# Patient Record
Sex: FEMALE | Race: WHITE | NOT HISPANIC OR LATINO
[De-identification: names, ages, dates, MRNs, and addresses within clinical notes are randomized per-mention and may not be internally consistent; named-entity substitution may affect disease eponyms.]

---

## 2017-05-16 ENCOUNTER — RESULT REVIEW (OUTPATIENT)
Age: 47
End: 2017-05-16

## 2017-10-05 ENCOUNTER — APPOINTMENT (OUTPATIENT)
Dept: VASCULAR SURGERY | Facility: CLINIC | Age: 47
End: 2017-10-05
Payer: COMMERCIAL

## 2017-10-05 VITALS
HEART RATE: 78 BPM | SYSTOLIC BLOOD PRESSURE: 105 MMHG | DIASTOLIC BLOOD PRESSURE: 61 MMHG | BODY MASS INDEX: 21.85 KG/M2 | WEIGHT: 128 LBS | HEIGHT: 64 IN | TEMPERATURE: 97.6 F

## 2017-10-05 VITALS — DIASTOLIC BLOOD PRESSURE: 69 MMHG | HEART RATE: 75 BPM | SYSTOLIC BLOOD PRESSURE: 102 MMHG

## 2017-10-05 PROCEDURE — 99203 OFFICE O/P NEW LOW 30 MIN: CPT | Mod: 25

## 2017-10-05 PROCEDURE — 93970 EXTREMITY STUDY: CPT

## 2017-10-05 PROCEDURE — 0: CUSTOM

## 2017-11-20 ENCOUNTER — OUTPATIENT (OUTPATIENT)
Dept: OUTPATIENT SERVICES | Facility: HOSPITAL | Age: 47
LOS: 1 days | End: 2017-11-20

## 2017-11-20 VITALS
WEIGHT: 128.09 LBS | SYSTOLIC BLOOD PRESSURE: 102 MMHG | TEMPERATURE: 98 F | DIASTOLIC BLOOD PRESSURE: 78 MMHG | RESPIRATION RATE: 16 BRPM | HEART RATE: 66 BPM | HEIGHT: 64 IN

## 2017-11-20 DIAGNOSIS — I83.811 VARICOSE VEINS OF RIGHT LOWER EXTREMITY WITH PAIN: ICD-10-CM
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
## 2017-11-20 DIAGNOSIS — I83.893 VARICOSE VEINS OF BILATERAL LOWER EXTREMITIES WITH OTHER COMPLICATIONS: ICD-10-CM

## 2017-11-20 LAB
HCT VFR BLD CALC: 40.8 % — SIGNIFICANT CHANGE UP (ref 34.5–45)
HGB BLD-MCNC: 13.7 G/DL — SIGNIFICANT CHANGE UP (ref 11.5–15.5)
MCHC RBC-ENTMCNC: 30.6 PG — SIGNIFICANT CHANGE UP (ref 27–34)
MCHC RBC-ENTMCNC: 33.6 % — SIGNIFICANT CHANGE UP (ref 32–36)
MCV RBC AUTO: 91.1 FL — SIGNIFICANT CHANGE UP (ref 80–100)
NRBC # FLD: 0 — SIGNIFICANT CHANGE UP
PLATELET # BLD AUTO: 215 K/UL — SIGNIFICANT CHANGE UP (ref 150–400)
PMV BLD: 9.9 FL — SIGNIFICANT CHANGE UP (ref 7–13)
RBC # BLD: 4.48 M/UL — SIGNIFICANT CHANGE UP (ref 3.8–5.2)
RBC # FLD: 12.8 % — SIGNIFICANT CHANGE UP (ref 10.3–14.5)
WBC # BLD: 6.89 K/UL — SIGNIFICANT CHANGE UP (ref 3.8–10.5)
WBC # FLD AUTO: 6.89 K/UL — SIGNIFICANT CHANGE UP (ref 3.8–10.5)

## 2017-11-20 RX ORDER — SODIUM CHLORIDE 9 MG/ML
1000 INJECTION, SOLUTION INTRAVENOUS
Qty: 0 | Refills: 0 | Status: DISCONTINUED | OUTPATIENT
Start: 2017-11-28 | End: 2017-12-13

## 2017-11-20 NOTE — H&P PST ADULT - PROBLEM SELECTOR PLAN 1
Patient scheduled right leg stab phlebectomies on 11/28/17.   preop instructions given and explained patient verbalized understanding  Hibiclens given with instructions  urine container given with instructions for day of surgery

## 2017-11-20 NOTE — H&P PST ADULT - MUSCULOSKELETAL
details… detailed exam ROM intact/no joint swelling/no joint warmth/no joint erythema/normal strength

## 2017-11-20 NOTE — H&P PST ADULT - NEGATIVE MUSCULOSKELETAL SYMPTOMS
no stiffness/no arm pain L/no neck pain/no back pain/no leg pain L/no arthritis/no arm pain R/no leg pain R

## 2017-11-20 NOTE — H&P PST ADULT - NSANTHOSAYNRD_GEN_A_CORE
No. MONI screening performed.  STOP BANG Legend: 0-2 = LOW Risk; 3-4 = INTERMEDIATE Risk; 5-8 = HIGH Risk

## 2017-11-20 NOTE — H&P PST ADULT - HISTORY OF PRESENT ILLNESS
48 y/o female presents to PST for scheduled right leg stab phlebectomies on 11/28/17. Patient right leg pain and heat over last 9 years that has progressively worsen, Seen by vascular, laser surgery, receive several injections with no relief.

## 2017-11-28 ENCOUNTER — OTHER (OUTPATIENT)
Age: 47
End: 2017-11-28

## 2017-11-28 ENCOUNTER — APPOINTMENT (OUTPATIENT)
Dept: VASCULAR SURGERY | Facility: HOSPITAL | Age: 47
End: 2017-11-28

## 2017-11-28 ENCOUNTER — OUTPATIENT (OUTPATIENT)
Dept: OUTPATIENT SERVICES | Facility: HOSPITAL | Age: 47
LOS: 1 days | Discharge: HOME | End: 2017-11-28
Payer: COMMERCIAL

## 2017-11-28 ENCOUNTER — TRANSCRIPTION ENCOUNTER (OUTPATIENT)
Age: 47
End: 2017-11-28

## 2017-11-28 VITALS
HEART RATE: 68 BPM | DIASTOLIC BLOOD PRESSURE: 68 MMHG | SYSTOLIC BLOOD PRESSURE: 92 MMHG | TEMPERATURE: 98 F | HEIGHT: 64 IN | RESPIRATION RATE: 16 BRPM | WEIGHT: 128.09 LBS

## 2017-11-28 VITALS
HEART RATE: 59 BPM | RESPIRATION RATE: 17 BRPM | SYSTOLIC BLOOD PRESSURE: 95 MMHG | OXYGEN SATURATION: 99 % | DIASTOLIC BLOOD PRESSURE: 55 MMHG

## 2017-11-28 VITALS
SYSTOLIC BLOOD PRESSURE: 92 MMHG | HEIGHT: 64 IN | HEART RATE: 68 BPM | RESPIRATION RATE: 16 BRPM | TEMPERATURE: 98 F | WEIGHT: 128.09 LBS | DIASTOLIC BLOOD PRESSURE: 68 MMHG

## 2017-11-28 DIAGNOSIS — I83.893 VARICOSE VEINS OF BILATERAL LOWER EXTREMITIES WITH OTHER COMPLICATIONS: ICD-10-CM
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
## 2017-11-28 DIAGNOSIS — G89.18 OTHER ACUTE POSTPROCEDURAL PAIN: ICD-10-CM

## 2017-11-28 LAB — HCG UR QL: NEGATIVE — SIGNIFICANT CHANGE UP

## 2017-11-28 PROCEDURE — 37765 STAB PHLEB VEINS XTR 10-20: CPT | Mod: RT

## 2017-11-28 RX ORDER — ACETAMINOPHEN 500 MG
2 TABLET ORAL
Qty: 0 | Refills: 0 | COMMUNITY

## 2017-11-28 RX ORDER — ONDANSETRON 8 MG/1
4 TABLET, FILM COATED ORAL ONCE
Qty: 0 | Refills: 0 | Status: DISCONTINUED | OUTPATIENT
Start: 2017-11-28 | End: 2017-11-28

## 2017-11-28 RX ORDER — ASPIRIN/CALCIUM CARB/MAGNESIUM 324 MG
1 TABLET ORAL
Qty: 0 | Refills: 0 | COMMUNITY

## 2017-11-28 RX ORDER — FENTANYL CITRATE 50 UG/ML
50 INJECTION INTRAVENOUS
Qty: 0 | Refills: 0 | Status: DISCONTINUED | OUTPATIENT
Start: 2017-11-28 | End: 2017-11-28

## 2017-11-28 RX ORDER — SODIUM CHLORIDE 9 MG/ML
1000 INJECTION, SOLUTION INTRAVENOUS
Qty: 0 | Refills: 0 | Status: DISCONTINUED | OUTPATIENT
Start: 2017-11-28 | End: 2017-12-13

## 2017-11-28 RX ORDER — FENTANYL CITRATE 50 UG/ML
25 INJECTION INTRAVENOUS
Qty: 0 | Refills: 0 | Status: DISCONTINUED | OUTPATIENT
Start: 2017-11-28 | End: 2017-11-28

## 2017-11-28 NOTE — ASU DISCHARGE PLAN (ADULT/PEDIATRIC). - NOTIFY
Persistent Nausea and Vomiting/Numbness, color, or temperature change to extremity/Bleeding that does not stop/Fever greater than 101/Swelling that continues Swelling that continues/Unable to Urinate/Increased Irritability or Sluggishness/Numbness, tingling/Inability to Tolerate Liquids or Foods/Persistent Nausea and Vomiting/Pain not relieved by Medications/Numbness, color, or temperature change to extremity/Bleeding that does not stop/Excessive Diarrhea/Fever greater than 101

## 2017-11-28 NOTE — ASU DISCHARGE PLAN (ADULT/PEDIATRIC). - MEDICATION SUMMARY - MEDICATIONS TO TAKE
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

## 2017-11-28 NOTE — ASU DISCHARGE PLAN (ADULT/PEDIATRIC). - ASU FOLLOWUP
911 or go to the nearest Emergency Room may also call Recovery Room (PACU) 24/7 @ [phone]/[facility] ASU (Adult):

## 2017-12-21 ENCOUNTER — APPOINTMENT (OUTPATIENT)
Dept: VASCULAR SURGERY | Facility: CLINIC | Age: 47
End: 2017-12-21
Payer: COMMERCIAL

## 2017-12-21 VITALS
TEMPERATURE: 98.5 F | WEIGHT: 128 LBS | HEART RATE: 81 BPM | SYSTOLIC BLOOD PRESSURE: 111 MMHG | HEIGHT: 64 IN | DIASTOLIC BLOOD PRESSURE: 67 MMHG | BODY MASS INDEX: 21.85 KG/M2

## 2017-12-21 VITALS — DIASTOLIC BLOOD PRESSURE: 73 MMHG | HEART RATE: 78 BPM | SYSTOLIC BLOOD PRESSURE: 109 MMHG

## 2017-12-21 PROCEDURE — 99211 OFF/OP EST MAY X REQ PHY/QHP: CPT

## 2018-09-18 NOTE — ASU PREOP CHECKLIST - SIDE RAILS UP
End Of Shift Functional Summary, Nursing TOILETING:   
Does patient need assist with adjusting pants up or down and/or pericare? yes If yes, please indicate what the patient needs help with:  Pants and her top Pt uses walker. Does the patient require extra time? yes Does the patient require contact guard assistance? Does the patient require more than one staff member for assistance? yes BLADDER:   
Pt does not have a medina catheter that staff manages. Pt does not take medication. If so, please indicate which medication: flow max Pt is incontinent. of bladder and voids in brief  Pt requires staff to change brief Pt has had 1 bladder accidents during this shift requiring minimal assistance to clean up. (An accident is when the episode is not contained in a brief AND/OR the clothing/linen requires changing/cleaning up.) EATING Pt requires assist to set up Documentation reviewed and plan of care discussed/reviewed with  
oncoming nurse during the shift. n/a

## 2018-09-28 PROBLEM — I83.811 VARICOSE VEINS OF RIGHT LOWER EXTREMITY WITH PAIN: Chronic | Status: ACTIVE | Noted: 2017-11-20

## 2018-10-12 ENCOUNTER — APPOINTMENT (OUTPATIENT)
Dept: VASCULAR SURGERY | Facility: CLINIC | Age: 48
End: 2018-10-12
Payer: COMMERCIAL

## 2018-10-12 VITALS
TEMPERATURE: 98.4 F | HEART RATE: 69 BPM | WEIGHT: 127 LBS | BODY MASS INDEX: 21.42 KG/M2 | HEIGHT: 64.5 IN | SYSTOLIC BLOOD PRESSURE: 109 MMHG | DIASTOLIC BLOOD PRESSURE: 74 MMHG

## 2018-10-12 VITALS — HEART RATE: 76 BPM | DIASTOLIC BLOOD PRESSURE: 77 MMHG | SYSTOLIC BLOOD PRESSURE: 118 MMHG

## 2018-10-12 DIAGNOSIS — I83.893 VARICOSE VEINS OF BILATERAL LOWER EXTREMITIES WITH OTHER COMPLICATIONS: ICD-10-CM

## 2018-10-12 DIAGNOSIS — Z87.891 PERSONAL HISTORY OF NICOTINE DEPENDENCE: ICD-10-CM

## 2018-10-12 DIAGNOSIS — Z78.9 OTHER SPECIFIED HEALTH STATUS: ICD-10-CM

## 2018-10-12 PROCEDURE — 99211 OFF/OP EST MAY X REQ PHY/QHP: CPT

## 2018-10-12 RX ORDER — OXYCODONE AND ACETAMINOPHEN 2.5; 325 MG/1; MG/1
2.5-325 TABLET ORAL
Qty: 5 | Refills: 0 | Status: COMPLETED | COMMUNITY
Start: 2017-11-28 | End: 2018-10-12

## 2018-12-14 ENCOUNTER — APPOINTMENT (OUTPATIENT)
Dept: VASCULAR SURGERY | Facility: CLINIC | Age: 48
End: 2018-12-14
Payer: SELF-PAY

## 2018-12-14 DIAGNOSIS — I78.1 NEVUS, NON-NEOPLASTIC: ICD-10-CM

## 2018-12-14 PROCEDURE — 36468 NJX SCLRSNT SPIDER VEINS: CPT | Mod: RT

## 2019-01-28 ENCOUNTER — APPOINTMENT (OUTPATIENT)
Dept: VASCULAR SURGERY | Facility: CLINIC | Age: 49
End: 2019-01-28

## 2019-02-15 ENCOUNTER — APPOINTMENT (OUTPATIENT)
Dept: VASCULAR SURGERY | Facility: CLINIC | Age: 49
End: 2019-02-15

## 2019-02-15 ENCOUNTER — MESSAGE (OUTPATIENT)
Age: 49
End: 2019-02-15

## 2020-08-12 ENCOUNTER — OUTPATIENT (OUTPATIENT)
Dept: OUTPATIENT SERVICES | Facility: HOSPITAL | Age: 50
LOS: 1 days | End: 2020-08-12
Payer: COMMERCIAL

## 2020-08-12 ENCOUNTER — APPOINTMENT (OUTPATIENT)
Dept: ULTRASOUND IMAGING | Facility: CLINIC | Age: 50
End: 2020-08-12
Payer: COMMERCIAL

## 2020-08-12 ENCOUNTER — RESULT REVIEW (OUTPATIENT)
Age: 50
End: 2020-08-12

## 2020-08-12 DIAGNOSIS — R92.8 OTHER ABNORMAL AND INCONCLUSIVE FINDINGS ON DIAGNOSTIC IMAGING OF BREAST: ICD-10-CM
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
## 2020-08-12 PROCEDURE — 77065 DX MAMMO INCL CAD UNI: CPT | Mod: 26,RT

## 2020-08-12 PROCEDURE — 88305 TISSUE EXAM BY PATHOLOGIST: CPT | Mod: 26

## 2020-08-12 PROCEDURE — 19083 BX BREAST 1ST LESION US IMAG: CPT

## 2020-08-12 PROCEDURE — A4648: CPT

## 2020-08-12 PROCEDURE — 19083 BX BREAST 1ST LESION US IMAG: CPT | Mod: RT

## 2020-08-12 PROCEDURE — 77065 DX MAMMO INCL CAD UNI: CPT

## 2020-08-12 PROCEDURE — 88305 TISSUE EXAM BY PATHOLOGIST: CPT

## 2020-08-19 LAB — SURGICAL PATHOLOGY STUDY: SIGNIFICANT CHANGE UP

## 2021-07-15 ENCOUNTER — APPOINTMENT (OUTPATIENT)
Dept: MAMMOGRAPHY | Facility: CLINIC | Age: 51
End: 2021-07-15
Payer: COMMERCIAL

## 2021-07-15 ENCOUNTER — APPOINTMENT (OUTPATIENT)
Dept: ULTRASOUND IMAGING | Facility: CLINIC | Age: 51
End: 2021-07-15
Payer: COMMERCIAL

## 2021-07-15 PROCEDURE — 77067 SCR MAMMO BI INCL CAD: CPT

## 2021-07-15 PROCEDURE — 76641 ULTRASOUND BREAST COMPLETE: CPT | Mod: 50

## 2021-07-15 PROCEDURE — 77063 BREAST TOMOSYNTHESIS BI: CPT

## 2022-07-13 ENCOUNTER — APPOINTMENT (OUTPATIENT)
Dept: MAMMOGRAPHY | Facility: CLINIC | Age: 52
End: 2022-07-13

## 2022-07-13 ENCOUNTER — APPOINTMENT (OUTPATIENT)
Dept: ULTRASOUND IMAGING | Facility: CLINIC | Age: 52
End: 2022-07-13

## 2022-07-13 PROCEDURE — 77063 BREAST TOMOSYNTHESIS BI: CPT

## 2022-07-13 PROCEDURE — 77067 SCR MAMMO BI INCL CAD: CPT

## 2022-07-13 PROCEDURE — 76641 ULTRASOUND BREAST COMPLETE: CPT | Mod: 50

## 2022-07-26 ENCOUNTER — TRANSCRIPTION ENCOUNTER (OUTPATIENT)
Age: 52
End: 2022-07-26

## 2022-09-13 ENCOUNTER — OFFICE (OUTPATIENT)
Dept: URBAN - METROPOLITAN AREA CLINIC 27 | Facility: CLINIC | Age: 52
Setting detail: OPHTHALMOLOGY
End: 2022-09-13
Payer: COMMERCIAL

## 2022-09-13 DIAGNOSIS — H10.45: ICD-10-CM

## 2022-09-13 DIAGNOSIS — H18.593: ICD-10-CM

## 2022-09-13 DIAGNOSIS — H10.9: ICD-10-CM

## 2022-09-13 PROCEDURE — 92002 INTRM OPH EXAM NEW PATIENT: CPT | Performed by: OPHTHALMOLOGY

## 2022-09-13 ASSESSMENT — LID EXAM ASSESSMENTS
OS_COMMENTS: LID EVERSION - NO FB
OD_COMMENTS: LID EVERSION - NO FB

## 2022-09-13 ASSESSMENT — TONOMETRY
OS_IOP_MMHG: 18
OD_IOP_MMHG: 12

## 2022-09-13 ASSESSMENT — CONFRONTATIONAL VISUAL FIELD TEST (CVF)
OS_FINDINGS: FULL
OD_FINDINGS: FULL

## 2022-09-14 PROBLEM — H10.45 ALLERGIC CONJUNCTIVITIS ; RIGHT EYE: Status: ACTIVE | Noted: 2022-09-13

## 2022-09-14 PROBLEM — H10.9 CONJUNCTIVITIS, UNSPECIFIED ; RIGHT EYE: Status: ACTIVE | Noted: 2022-09-13

## 2022-09-15 ASSESSMENT — AXIALLENGTH_DERIVED: OD_AL: 23.3564

## 2022-09-15 ASSESSMENT — REFRACTION_CURRENTRX
OD_SPHERE: +1.25
OS_OVR_VA: 20/
OD_OVR_VA: 20/
OS_SPHERE: +1.25

## 2022-09-15 ASSESSMENT — KERATOMETRY
OS_K2POWER_DIOPTERS: 45.00
OD_K1POWER_DIOPTERS: 43.50
OS_K1POWER_DIOPTERS: 44.00
OS_AXISANGLE_DEGREES: 091
OD_K2POWER_DIOPTERS: 44.00
OD_AXISANGLE_DEGREES: 097

## 2022-09-15 ASSESSMENT — REFRACTION_AUTOREFRACTION
OS_CYLINDER: +0.25
OD_CYLINDER: +0.25
OS_AXIS: 099
OD_AXIS: 136
OD_SPHERE: +0.25
OS_SPHERE: PLANO

## 2022-09-15 ASSESSMENT — SPHEQUIV_DERIVED: OD_SPHEQUIV: 0.375

## 2022-09-15 ASSESSMENT — VISUAL ACUITY
OD_BCVA: 20/20
OS_BCVA: 20/20-2

## 2022-11-28 ENCOUNTER — APPOINTMENT (OUTPATIENT)
Dept: RADIOLOGY | Facility: CLINIC | Age: 52
End: 2022-11-28

## 2022-11-28 PROCEDURE — 77080 DXA BONE DENSITY AXIAL: CPT

## 2022-11-30 NOTE — ASU PATIENT PROFILE, ADULT - DOES PATIENT HAVE ADVANCE DIRECTIVE
Detail Level: Detailed
Quality 110: Preventive Care And Screening: Influenza Immunization: Influenza Immunization not Administered because Patient Refused.
No

## 2022-12-23 NOTE — ASU PATIENT PROFILE, ADULT - FALL HARM RISK CONCLUSION
PROVIDER:[TOKEN:[[id number]],FOLLOWUP:[1 week],ESTABLISHEDPATIENT:[T]]
Universal Safety Interventions

## 2023-06-19 ENCOUNTER — APPOINTMENT (OUTPATIENT)
Dept: MAMMOGRAPHY | Facility: CLINIC | Age: 53
End: 2023-06-19
Payer: COMMERCIAL

## 2023-06-19 ENCOUNTER — APPOINTMENT (OUTPATIENT)
Dept: ULTRASOUND IMAGING | Facility: CLINIC | Age: 53
End: 2023-06-19
Payer: COMMERCIAL

## 2023-06-19 PROCEDURE — 77067 SCR MAMMO BI INCL CAD: CPT

## 2023-06-19 PROCEDURE — 77063 BREAST TOMOSYNTHESIS BI: CPT

## 2023-06-19 PROCEDURE — 76641 ULTRASOUND BREAST COMPLETE: CPT | Mod: 50

## 2023-10-11 ENCOUNTER — OFFICE (OUTPATIENT)
Dept: URBAN - METROPOLITAN AREA CLINIC 27 | Facility: CLINIC | Age: 53
Setting detail: OPHTHALMOLOGY
End: 2023-10-11
Payer: COMMERCIAL

## 2023-10-11 DIAGNOSIS — H00.15: ICD-10-CM

## 2023-10-11 DIAGNOSIS — H01.005: ICD-10-CM

## 2023-10-11 DIAGNOSIS — H01.002: ICD-10-CM

## 2023-10-11 PROCEDURE — 92012 INTRM OPH EXAM EST PATIENT: CPT | Performed by: OPHTHALMOLOGY

## 2023-10-11 PROCEDURE — 92285 EXTERNAL OCULAR PHOTOGRAPHY: CPT | Performed by: OPHTHALMOLOGY

## 2023-10-11 ASSESSMENT — REFRACTION_AUTOREFRACTION
OD_SPHERE: +0.25
OS_AXIS: 099
OS_SPHERE: PLANO
OD_AXIS: 136
OS_CYLINDER: +0.25
OD_CYLINDER: +0.25

## 2023-10-11 ASSESSMENT — CONFRONTATIONAL VISUAL FIELD TEST (CVF)
OD_FINDINGS: FULL
OS_FINDINGS: FULL

## 2023-10-11 ASSESSMENT — KERATOMETRY
OD_K2POWER_DIOPTERS: 44.00
OD_AXISANGLE_DEGREES: 097
OS_K2POWER_DIOPTERS: 45.00
OD_K1POWER_DIOPTERS: 43.50
OS_AXISANGLE_DEGREES: 091
OS_K1POWER_DIOPTERS: 44.00

## 2023-10-11 ASSESSMENT — SPHEQUIV_DERIVED: OD_SPHEQUIV: 0.375

## 2023-10-11 ASSESSMENT — VISUAL ACUITY
OS_BCVA: 20/20
OD_BCVA: 20/20-1

## 2023-10-11 ASSESSMENT — LID EXAM ASSESSMENTS
OS_BLEPHARITIS: 2+
OD_BLEPHARITIS: 2+

## 2023-10-11 ASSESSMENT — AXIALLENGTH_DERIVED: OD_AL: 23.3564

## 2024-06-20 ENCOUNTER — APPOINTMENT (OUTPATIENT)
Dept: ULTRASOUND IMAGING | Facility: CLINIC | Age: 54
End: 2024-06-20
Payer: COMMERCIAL

## 2024-06-20 ENCOUNTER — APPOINTMENT (OUTPATIENT)
Dept: MAMMOGRAPHY | Facility: CLINIC | Age: 54
End: 2024-06-20
Payer: COMMERCIAL

## 2024-06-20 PROCEDURE — 77067 SCR MAMMO BI INCL CAD: CPT

## 2024-06-20 PROCEDURE — 76641 ULTRASOUND BREAST COMPLETE: CPT | Mod: 50

## 2024-06-20 PROCEDURE — 77063 BREAST TOMOSYNTHESIS BI: CPT

## 2024-06-25 ENCOUNTER — APPOINTMENT (OUTPATIENT)
Dept: OBGYN | Facility: CLINIC | Age: 54
End: 2024-06-25
Payer: COMMERCIAL

## 2024-06-25 PROCEDURE — 96127 BRIEF EMOTIONAL/BEHAV ASSMT: CPT

## 2024-06-25 PROCEDURE — 99459 PELVIC EXAMINATION: CPT

## 2024-06-25 PROCEDURE — 99386 PREV VISIT NEW AGE 40-64: CPT

## 2025-05-02 ENCOUNTER — APPOINTMENT (OUTPATIENT)
Dept: OBGYN | Facility: CLINIC | Age: 55
End: 2025-05-02
Payer: COMMERCIAL

## 2025-05-02 PROCEDURE — 99459 PELVIC EXAMINATION: CPT

## 2025-05-02 PROCEDURE — 96127 BRIEF EMOTIONAL/BEHAV ASSMT: CPT

## 2025-05-02 PROCEDURE — 99396 PREV VISIT EST AGE 40-64: CPT

## 2025-05-08 ENCOUNTER — APPOINTMENT (OUTPATIENT)
Dept: ULTRASOUND IMAGING | Facility: CLINIC | Age: 55
End: 2025-05-08
Payer: COMMERCIAL

## 2025-05-08 ENCOUNTER — APPOINTMENT (OUTPATIENT)
Dept: MAMMOGRAPHY | Facility: CLINIC | Age: 55
End: 2025-05-08
Payer: COMMERCIAL

## 2025-05-08 PROCEDURE — 77067 SCR MAMMO BI INCL CAD: CPT

## 2025-05-08 PROCEDURE — 77063 BREAST TOMOSYNTHESIS BI: CPT

## 2025-05-08 PROCEDURE — 76641 ULTRASOUND BREAST COMPLETE: CPT | Mod: 50
